# Patient Record
Sex: FEMALE | Race: WHITE | NOT HISPANIC OR LATINO | Employment: OTHER | URBAN - METROPOLITAN AREA
[De-identification: names, ages, dates, MRNs, and addresses within clinical notes are randomized per-mention and may not be internally consistent; named-entity substitution may affect disease eponyms.]

---

## 2018-03-20 ENCOUNTER — TELEPHONE (OUTPATIENT)
Dept: HEMATOLOGY ONCOLOGY | Facility: MEDICAL CENTER | Age: 63
End: 2018-03-20

## 2018-03-20 NOTE — TELEPHONE ENCOUNTER
The patient left a message with Virginia Lang regarding a referral to the lung cancer screening program.  Returned the patient's call and left a message informing her that I will contact Dr. Fitzgerald's office and request another order.  Asked her to please call back so I can verify eligibility and that we received the order.

## 2018-03-22 ENCOUNTER — TELEPHONE (OUTPATIENT)
Dept: HEMATOLOGY ONCOLOGY | Facility: MEDICAL CENTER | Age: 63
End: 2018-03-22

## 2018-03-22 DIAGNOSIS — F17.210 CIGARETTE NICOTINE DEPENDENCE WITHOUT COMPLICATION: Primary | ICD-10-CM

## 2018-03-22 NOTE — TELEPHONE ENCOUNTER
Need additional information. 1st attempt to contact the patient,- left voicemail for patient requesting a return call to verify eligibility for LCSP.   Ref: Jacinda Shoemaker   Dx: Nicotine Dependence

## 2018-03-26 ENCOUNTER — TELEPHONE (OUTPATIENT)
Dept: HEMATOLOGY ONCOLOGY | Facility: MEDICAL CENTER | Age: 63
End: 2018-03-26

## 2018-03-26 NOTE — TELEPHONE ENCOUNTER
Received referral to lung cancer screening program.  Chart review to assess for lung cancer screening program eligibility.   1. Age 55-77 yrs of age? Yes 62 y.o.  2. 30 pack year hx of smoking, or greater? Yes 1/2-1 ppdx 51 yrs= 38.25 pkyr hx  3. Current smoker or if quit, has pt quit within last 15 yrs?Yes  Current- a little  4. Any signs or symptoms of lung cancer? None noted  5. Previous history of lung cancer? None noted  6. Chest CT within past 12 mos.? None noted  Patient does meet eligibility criteria. LCSP scheduling notified to schedule the shared decision making visit.

## 2018-04-05 ENCOUNTER — OFFICE VISIT (OUTPATIENT)
Dept: HEMATOLOGY ONCOLOGY | Facility: MEDICAL CENTER | Age: 63
End: 2018-04-05
Payer: COMMERCIAL

## 2018-04-05 VITALS
HEIGHT: 60 IN | DIASTOLIC BLOOD PRESSURE: 78 MMHG | WEIGHT: 171.41 LBS | HEART RATE: 71 BPM | RESPIRATION RATE: 16 BRPM | TEMPERATURE: 98.1 F | BODY MASS INDEX: 33.65 KG/M2 | OXYGEN SATURATION: 93 % | SYSTOLIC BLOOD PRESSURE: 122 MMHG

## 2018-04-05 DIAGNOSIS — F17.210 CIGARETTE SMOKER: ICD-10-CM

## 2018-04-05 PROCEDURE — G0296 VISIT TO DETERM LDCT ELIG: HCPCS | Performed by: NURSE PRACTITIONER

## 2018-04-05 ASSESSMENT — ENCOUNTER SYMPTOMS
SHORTNESS OF BREATH: 0
WEIGHT LOSS: 0
COUGH: 1
WHEEZING: 1
HEMOPTYSIS: 0
SPUTUM PRODUCTION: 1

## 2018-04-05 ASSESSMENT — PAIN SCALES - GENERAL: PAINLEVEL: NO PAIN

## 2018-04-05 NOTE — PROGRESS NOTES
"Subjective:      Savanna Mccarthy is a 62 y.o. female who presents for Lung Cancer Screening Program Prescreen (Ref: Jacinda Shoemaker   Dx: Nicotine Dependence) for lung cancer screening shared decision making visit.       HPI   Patient seen today for initial lung cancer screening visit. Patient referred by PCP, Jacinda Shoemaker.     The patient meets eligibility criteria including age, smoking history (30+ pack years), if former smoker, quit in the last 15 years, and absence of signs or symptoms of lung cancer.    - Age - 62  - Smoking history - Patient has smoked for 51 years at an average of 1 ppd = 50 pack year smoking history.  - Current smoking status - Current smoker  - No symptoms of lung cancer and no previous history of lung cancer       Allergies   Allergen Reactions   • Ciprofloxacin Vomiting   • Morphine      \"makes her crazy\"         Current Outpatient Prescriptions on File Prior to Visit   Medication Sig Dispense Refill   • calcium carbonate (TUMS) 500 MG CHEW Take 500 mg by mouth every 2 hours.     • oxycodone-acetaminophen (PERCOCET) 5-325 MG TABS Take 1-2 Tabs by mouth every four hours as needed. 20 Each 0   • Oxycodone-Acetaminophen (PERCOCET-10)  MG TABS Take 1-2 Tabs by mouth every four hours as needed.       • methocarbamol (ROBAXIN) 500 MG TABS Take 500 mg by mouth every 8 hours as needed.       • cephALEXin (KEFLEX) 500 MG CAPS Take 500 mg by mouth every 6 hours. For ten days      • Homeopathic Products (ZICAM COLD REMEDY PO) Take  by mouth.       • Ascorbic Acid (VITAMIN C CR) 1000 MG TBCR Take 1,000 mg by mouth.       • varenicline (CHANTIX) 1 MG tablet Take 1 mg by mouth 2 times a day.     • docosahexanoic acid (OMEGA 3 FA) 1000 MG CAPS Take 1,000 mg by mouth 3 times a day, with meals.     • therapeutic multivitamin-minerals (THERAGRAN-M) TABS Take 1 Tab by mouth every day.     • magnesium oxide (MAG-OX) 400 MG TABS Take 400 mg by mouth every day.     • Pyridoxine HCl (VITAMIN " B-6 PO) Take  by mouth. 2mg daily        No current facility-administered medications on file prior to visit.        Review of Systems   Constitutional: Negative for malaise/fatigue and weight loss.   Respiratory: Positive for cough (morning), sputum production (yellow) and wheezing (no inhalers - clears with cough). Negative for hemoptysis and shortness of breath.         Objective:     /78   Pulse 71   Temp 36.7 °C (98.1 °F)   Resp 16   Ht 1.524 m (5')   Wt 77.7 kg (171 lb 6.5 oz)   SpO2 93%   Breastfeeding? No   BMI 33.48 kg/m²        Physical Exam   Constitutional: She is oriented to person, place, and time. She appears well-developed and well-nourished. No distress.   Cardiovascular: Normal rate, regular rhythm and normal heart sounds.  Exam reveals no gallop and no friction rub.    No murmur heard.  Pulmonary/Chest: Effort normal and breath sounds normal. No respiratory distress. She has no wheezes.   Musculoskeletal: Normal range of motion.   Neurological: She is alert and oriented to person, place, and time.   Skin: Skin is warm and dry. She is not diaphoretic.   Vitals reviewed.         Assessment/Plan:     1. Cigarette smoker  CT-LUNG CANCER-SCREENING         We conducted a shared decision-making process using a decision aid. We reviewed benefits and harms of screening, including false positives and potential need for additional diagnostic testing, the possibility of over diagnosis, and total radiation exposure.    We discussed the importance of adhering to annual LDCT screening. We also discussed the impact of comorbities on the patient's the ability or willingness to undergo diagnostic procedure(s) and treatment.    Counseling on the importance of maintaining cigarette smoking abstinence if former smoker; or the importance of smoking cessation if current smoker and, if appropriate, furnishing of information about tobacco cessation interventions. I provided patient with smoking cessation  materials and resources within [facility] and the community. Patient appreciative of the resources.     Based on our discussion, we have decided to begin annual lung cancer screening starting now.

## 2018-04-12 ENCOUNTER — HOSPITAL ENCOUNTER (OUTPATIENT)
Dept: RADIOLOGY | Facility: MEDICAL CENTER | Age: 63
End: 2018-04-12
Attending: NURSE PRACTITIONER
Payer: COMMERCIAL

## 2018-04-12 DIAGNOSIS — F17.210 CIGARETTE SMOKER: ICD-10-CM

## 2018-04-12 PROCEDURE — G0297 LDCT FOR LUNG CA SCREEN: HCPCS

## 2018-04-16 ENCOUNTER — TELEPHONE (OUTPATIENT)
Dept: HEMATOLOGY ONCOLOGY | Facility: MEDICAL CENTER | Age: 63
End: 2018-04-16

## 2018-04-16 NOTE — TELEPHONE ENCOUNTER
Attempted to reach patient with CT results for lung cancer screening. Left message requesting call back to RN at [phone].

## 2018-04-17 ENCOUNTER — TELEPHONE (OUTPATIENT)
Dept: HEMATOLOGY ONCOLOGY | Facility: MEDICAL CENTER | Age: 63
End: 2018-04-17

## 2018-04-17 NOTE — TELEPHONE ENCOUNTER
Second attempt to reach pt with lung cancer screening results.  Left voicemail requesting call back to RN.

## 2018-04-18 ENCOUNTER — TELEPHONE (OUTPATIENT)
Dept: HEMATOLOGY ONCOLOGY | Facility: MEDICAL CENTER | Age: 63
End: 2018-04-18

## 2018-04-18 NOTE — TELEPHONE ENCOUNTER
Third call to pt with results of LDCT exam performed on 4/12/18 .  Notified her that the results showed a 2 mm noncalcified pulmonary nodule identified in the right upper pulmonary lobe that had a benign, or non cancer, appearance. To make sure these nodule(s) are benign, and remain unchanged, we recommend a follow-up low-dose chest CT in 12 months.   Informed of incidental findings of calcific atherosclerosis is noted in the aorta and coronary arteries and mildly enlarged left adrenal gland is unchanged compared to the prior CT.with follow-up back to PCP.  Patient agrees to all recommendations. Referring provider Dr. Jacinda Shoemaker with [facility] faxed results and RN note at [phone] notified.  Health maintenance updated and patient sent lung cancer screening result letter.

## 2018-05-31 ENCOUNTER — HOSPITAL ENCOUNTER (OUTPATIENT)
Dept: HOSPITAL 8 - CFH | Age: 63
Discharge: HOME | End: 2018-05-31
Attending: FAMILY MEDICINE
Payer: COMMERCIAL

## 2018-05-31 DIAGNOSIS — I25.10: ICD-10-CM

## 2018-05-31 DIAGNOSIS — I25.9: Primary | ICD-10-CM

## 2018-05-31 PROCEDURE — 93017 CV STRESS TEST TRACING ONLY: CPT

## 2018-05-31 PROCEDURE — 78452 HT MUSCLE IMAGE SPECT MULT: CPT

## 2018-05-31 PROCEDURE — A9502 TC99M TETROFOSMIN: HCPCS

## 2018-09-04 ENCOUNTER — HOSPITAL ENCOUNTER (OUTPATIENT)
Dept: RADIOLOGY | Facility: MEDICAL CENTER | Age: 63
End: 2018-09-04
Attending: ORTHOPAEDIC SURGERY | Admitting: ORTHOPAEDIC SURGERY
Payer: COMMERCIAL

## 2018-09-04 DIAGNOSIS — Z01.811 PRE-OPERATIVE RESPIRATORY EXAMINATION: ICD-10-CM

## 2018-09-04 DIAGNOSIS — Z01.812 PRE-OPERATIVE LABORATORY EXAMINATION: ICD-10-CM

## 2018-09-04 DIAGNOSIS — Z01.810 PRE-OPERATIVE CARDIOVASCULAR EXAMINATION: ICD-10-CM

## 2018-09-04 LAB
ALBUMIN SERPL BCP-MCNC: 4.1 G/DL (ref 3.2–4.9)
ALBUMIN/GLOB SERPL: 1.7 G/DL
ALP SERPL-CCNC: 73 U/L (ref 30–99)
ALT SERPL-CCNC: 14 U/L (ref 2–50)
ANION GAP SERPL CALC-SCNC: 8 MMOL/L (ref 0–11.9)
AST SERPL-CCNC: 14 U/L (ref 12–45)
BILIRUB SERPL-MCNC: 0.3 MG/DL (ref 0.1–1.5)
BUN SERPL-MCNC: 12 MG/DL (ref 8–22)
CALCIUM SERPL-MCNC: 10 MG/DL (ref 8.5–10.5)
CHLORIDE SERPL-SCNC: 107 MMOL/L (ref 96–112)
CO2 SERPL-SCNC: 25 MMOL/L (ref 20–33)
CREAT SERPL-MCNC: 0.88 MG/DL (ref 0.5–1.4)
EKG IMPRESSION: NORMAL
ERYTHROCYTE [DISTWIDTH] IN BLOOD BY AUTOMATED COUNT: 50.6 FL (ref 35.9–50)
GLOBULIN SER CALC-MCNC: 2.4 G/DL (ref 1.9–3.5)
GLUCOSE SERPL-MCNC: 62 MG/DL (ref 65–99)
HCT VFR BLD AUTO: 46.3 % (ref 37–47)
HGB BLD-MCNC: 14.3 G/DL (ref 12–16)
MCH RBC QN AUTO: 29 PG (ref 27–33)
MCHC RBC AUTO-ENTMCNC: 30.9 G/DL (ref 33.6–35)
MCV RBC AUTO: 93.9 FL (ref 81.4–97.8)
PLATELET # BLD AUTO: 275 K/UL (ref 164–446)
PMV BLD AUTO: 10.4 FL (ref 9–12.9)
POTASSIUM SERPL-SCNC: 4.4 MMOL/L (ref 3.6–5.5)
PROT SERPL-MCNC: 6.5 G/DL (ref 6–8.2)
RBC # BLD AUTO: 4.93 M/UL (ref 4.2–5.4)
SODIUM SERPL-SCNC: 140 MMOL/L (ref 135–145)
WBC # BLD AUTO: 10.1 K/UL (ref 4.8–10.8)

## 2018-09-04 PROCEDURE — 36415 COLL VENOUS BLD VENIPUNCTURE: CPT

## 2018-09-04 PROCEDURE — 93010 ELECTROCARDIOGRAM REPORT: CPT | Performed by: INTERNAL MEDICINE

## 2018-09-04 PROCEDURE — 93005 ELECTROCARDIOGRAM TRACING: CPT

## 2018-09-04 PROCEDURE — 85027 COMPLETE CBC AUTOMATED: CPT

## 2018-09-04 PROCEDURE — 71045 X-RAY EXAM CHEST 1 VIEW: CPT

## 2018-09-04 PROCEDURE — 80053 COMPREHEN METABOLIC PANEL: CPT

## 2018-09-04 RX ORDER — HYDROCODONE BITARTRATE AND ACETAMINOPHEN 5; 325 MG/1; MG/1
1-2 TABLET ORAL EVERY 4 HOURS PRN
COMMUNITY
End: 2018-12-23

## 2018-09-04 RX ORDER — ASPIRIN 325 MG
325-975 TABLET ORAL EVERY 6 HOURS PRN
COMMUNITY

## 2018-09-04 RX ORDER — IBUPROFEN 800 MG/1
800 TABLET ORAL EVERY 8 HOURS PRN
COMMUNITY
End: 2018-12-23

## 2018-09-04 NOTE — OR NURSING
"Preadmit appointment: \" Preparing for your Procedure information\" sheet given to patient with verbal and written instructions. Patient instructed to continue prescribed medications through the day before surgery, instructed to take the following medications the day of surgery per anesthesia protocol: Hydrocodone if needed  "

## 2018-09-11 ENCOUNTER — HOSPITAL ENCOUNTER (OUTPATIENT)
Facility: MEDICAL CENTER | Age: 63
End: 2018-09-11
Attending: ORTHOPAEDIC SURGERY | Admitting: ORTHOPAEDIC SURGERY
Payer: COMMERCIAL

## 2018-09-11 VITALS
SYSTOLIC BLOOD PRESSURE: 106 MMHG | BODY MASS INDEX: 32.17 KG/M2 | TEMPERATURE: 96.8 F | HEART RATE: 69 BPM | HEIGHT: 61 IN | OXYGEN SATURATION: 91 % | WEIGHT: 170.42 LBS | RESPIRATION RATE: 16 BRPM | DIASTOLIC BLOOD PRESSURE: 56 MMHG

## 2018-09-11 PROCEDURE — A9270 NON-COVERED ITEM OR SERVICE: HCPCS

## 2018-09-11 PROCEDURE — 501838 HCHG SUTURE GENERAL: Performed by: ORTHOPAEDIC SURGERY

## 2018-09-11 PROCEDURE — 700102 HCHG RX REV CODE 250 W/ 637 OVERRIDE(OP)

## 2018-09-11 PROCEDURE — C1713 ANCHOR/SCREW BN/BN,TIS/BN: HCPCS | Performed by: ORTHOPAEDIC SURGERY

## 2018-09-11 PROCEDURE — 700111 HCHG RX REV CODE 636 W/ 250 OVERRIDE (IP)

## 2018-09-11 PROCEDURE — A4450 NON-WATERPROOF TAPE: HCPCS | Performed by: ORTHOPAEDIC SURGERY

## 2018-09-11 PROCEDURE — 160035 HCHG PACU - 1ST 60 MINS PHASE I: Performed by: ORTHOPAEDIC SURGERY

## 2018-09-11 PROCEDURE — 502576 HCHG PACK, HAND: Performed by: ORTHOPAEDIC SURGERY

## 2018-09-11 PROCEDURE — 160029 HCHG SURGERY MINUTES - 1ST 30 MINS LEVEL 4: Performed by: ORTHOPAEDIC SURGERY

## 2018-09-11 PROCEDURE — 160025 RECOVERY II MINUTES (STATS): Performed by: ORTHOPAEDIC SURGERY

## 2018-09-11 PROCEDURE — A6222 GAUZE <=16 IN NO W/SAL W/O B: HCPCS | Performed by: ORTHOPAEDIC SURGERY

## 2018-09-11 PROCEDURE — C1889 IMPLANT/INSERT DEVICE, NOC: HCPCS | Performed by: ORTHOPAEDIC SURGERY

## 2018-09-11 PROCEDURE — 160022 HCHG BLOCK: Performed by: ORTHOPAEDIC SURGERY

## 2018-09-11 PROCEDURE — 700105 HCHG RX REV CODE 258: Performed by: ORTHOPAEDIC SURGERY

## 2018-09-11 PROCEDURE — 500423 HCHG DRESSING, ABD COMBINE: Performed by: ORTHOPAEDIC SURGERY

## 2018-09-11 PROCEDURE — 502240 HCHG MISC OR SUPPLY RC 0272: Performed by: ORTHOPAEDIC SURGERY

## 2018-09-11 PROCEDURE — 502000 HCHG MISC OR IMPLANTS RC 0278: Performed by: ORTHOPAEDIC SURGERY

## 2018-09-11 PROCEDURE — 160048 HCHG OR STATISTICAL LEVEL 1-5: Performed by: ORTHOPAEDIC SURGERY

## 2018-09-11 PROCEDURE — 160002 HCHG RECOVERY MINUTES (STAT): Performed by: ORTHOPAEDIC SURGERY

## 2018-09-11 PROCEDURE — 160036 HCHG PACU - EA ADDL 30 MINS PHASE I: Performed by: ORTHOPAEDIC SURGERY

## 2018-09-11 PROCEDURE — 700101 HCHG RX REV CODE 250

## 2018-09-11 PROCEDURE — 502581 HCHG PACK, SHOULDER ARTHROSCOPY: Performed by: ORTHOPAEDIC SURGERY

## 2018-09-11 PROCEDURE — 160009 HCHG ANES TIME/MIN: Performed by: ORTHOPAEDIC SURGERY

## 2018-09-11 PROCEDURE — 500562 HCHG FIBERWIRE: Performed by: ORTHOPAEDIC SURGERY

## 2018-09-11 PROCEDURE — 160041 HCHG SURGERY MINUTES - EA ADDL 1 MIN LEVEL 4: Performed by: ORTHOPAEDIC SURGERY

## 2018-09-11 PROCEDURE — 160046 HCHG PACU - 1ST 60 MINS PHASE II: Performed by: ORTHOPAEDIC SURGERY

## 2018-09-11 DEVICE — GRAFT SOFT TISSUE DERMAL MATRIX  ALLOMEND 4X8CM: Type: IMPLANTABLE DEVICE | Site: SHOULDER | Status: FUNCTIONAL

## 2018-09-11 DEVICE — ANCHOR KNOTLESS REELX STT 4.5MM (5EA/BX): Type: IMPLANTABLE DEVICE | Site: SHOULDER | Status: FUNCTIONAL

## 2018-09-11 DEVICE — ANCHOR SUTURE ICONIX 3 WITH 3 STRANDS #2 FORCE FIBER 2.3MM (5EA/BX): Type: IMPLANTABLE DEVICE | Site: SHOULDER | Status: FUNCTIONAL

## 2018-09-11 RX ORDER — ONDANSETRON 2 MG/ML
INJECTION INTRAMUSCULAR; INTRAVENOUS
Status: COMPLETED
Start: 2018-09-11 | End: 2018-09-11

## 2018-09-11 RX ORDER — BACITRACIN 50000 [IU]/1
INJECTION, POWDER, FOR SOLUTION INTRAMUSCULAR
Status: DISCONTINUED | OUTPATIENT
Start: 2018-09-11 | End: 2018-09-11 | Stop reason: HOSPADM

## 2018-09-11 RX ORDER — OXYCODONE HCL 5 MG/5 ML
SOLUTION, ORAL ORAL
Status: COMPLETED
Start: 2018-09-11 | End: 2018-09-11

## 2018-09-11 RX ORDER — SODIUM CHLORIDE, SODIUM LACTATE, POTASSIUM CHLORIDE, CALCIUM CHLORIDE 600; 310; 30; 20 MG/100ML; MG/100ML; MG/100ML; MG/100ML
INJECTION, SOLUTION INTRAVENOUS CONTINUOUS
Status: DISCONTINUED | OUTPATIENT
Start: 2018-09-11 | End: 2018-09-11 | Stop reason: HOSPADM

## 2018-09-11 RX ORDER — LIDOCAINE HYDROCHLORIDE 10 MG/ML
INJECTION, SOLUTION EPIDURAL; INFILTRATION; INTRACAUDAL; PERINEURAL
Status: COMPLETED
Start: 2018-09-11 | End: 2018-09-11

## 2018-09-11 RX ORDER — EPINEPHRINE 1 MG/ML(1)
AMPUL (ML) INJECTION
Status: DISCONTINUED | OUTPATIENT
Start: 2018-09-11 | End: 2018-09-11 | Stop reason: HOSPADM

## 2018-09-11 RX ORDER — ACETAMINOPHEN 500 MG
TABLET ORAL
Status: COMPLETED
Start: 2018-09-11 | End: 2018-09-11

## 2018-09-11 RX ORDER — CEFAZOLIN SODIUM 1 G/3ML
INJECTION, POWDER, FOR SOLUTION INTRAMUSCULAR; INTRAVENOUS
Status: DISCONTINUED | OUTPATIENT
Start: 2018-09-11 | End: 2018-09-11 | Stop reason: HOSPADM

## 2018-09-11 RX ORDER — CELECOXIB 200 MG/1
CAPSULE ORAL
Status: COMPLETED
Start: 2018-09-11 | End: 2018-09-11

## 2018-09-11 RX ADMIN — OXYCODONE HYDROCHLORIDE 10 MG: 5 SOLUTION ORAL at 10:59

## 2018-09-11 RX ADMIN — ACETAMINOPHEN 1000 MG: 500 TABLET, COATED ORAL at 06:30

## 2018-09-11 RX ADMIN — ONDANSETRON 4 MG: 2 INJECTION INTRAMUSCULAR; INTRAVENOUS at 10:19

## 2018-09-11 RX ADMIN — LIDOCAINE HYDROCHLORIDE 0.2 ML: 10 INJECTION, SOLUTION EPIDURAL; INFILTRATION; INTRACAUDAL; PERINEURAL at 06:30

## 2018-09-11 RX ADMIN — SODIUM CHLORIDE, POTASSIUM CHLORIDE, SODIUM LACTATE AND CALCIUM CHLORIDE 1000 ML: 600; 310; 30; 20 INJECTION, SOLUTION INTRAVENOUS at 06:30

## 2018-09-11 RX ADMIN — CELECOXIB 400 MG: 200 CAPSULE ORAL at 06:30

## 2018-09-11 ASSESSMENT — PAIN SCALES - GENERAL
PAINLEVEL_OUTOF10: 0
PAINLEVEL_OUTOF10: 2
PAINLEVEL_OUTOF10: 6
PAINLEVEL_OUTOF10: 5
PAINLEVEL_OUTOF10: 6
PAINLEVEL_OUTOF10: 2
PAINLEVEL_OUTOF10: 0

## 2018-09-11 NOTE — OR NURSING
0944- Patient arrived from OR. Respirations even and unlabored. Patient sedated with oral airway in place. Surgical dressings in place to L shoulder and L wrist. C/D/I. Immobilizer in place. Pulses 2+ throughout. Brisk cap refill present. VSS, see flowsheets.   1000- Patient waking up. Declines nausea and pain. Reports pressure to L wrist. Declines need for medication.   1015- Patient reporting onset of nausea. Medication administered, see MAR.   1030- Patient in and out of sleep. Waking up irritable and uncooperative. Frequently pulling off oxygen, pulling at medical cords and IV. Redirection, reassurance, and repositioning provided.   1050- Patient reports decrease in nausea. Reporting 6/10 pressure in L wrist, requesting pain medication. Administered, see anesthesia record and MAR.   1105- Patient resting with eyes closed.   1115- Patient awake reports decrease in nausea and 2/10 pressure in L wrist. Requesting to go home. Breathing exercises completed-- cough deep breathing. O2 turned off.   1135- VSS, see flowsheets. Report to NICOLASA Richards.

## 2018-09-11 NOTE — OR SURGEON
Immediate Post OP Note    PreOp Diagnosis: LEFT carpal tunnel syndrome and large retracted rotator cuff tear with impingement and chronic LHB tendon tear    PostOp Diagnosis: SAME     Procedure(s): LEFT   CARPAL TUNNEL RELEASE - Wound Class: Clean  SHOULDER DECOMPRESSION ARTHROSCOPIC- SUBACROMIAL, LABRAL DEBRIDEMENT - Wound Class: Clean  SHOULDER ARTHROSCOPY W/ ROTATOR CUFF REPAIR-SUPERIOR CAPSULAR RECONSTRUCTION - Wound Class: Clean    Surgeon(s):  Margaret Olson M.D.    Anesthesiologist/Type of Anesthesia:  Anesthesiologist: Damon Sousa M.D.  Anesthesia Technician: Lenin Moreno/General    Surgical Staff:  Circulator: Lilly Jacob R.N.  Relief Circulator: Marie Kilgore R.N.  Scrub Person: Rufina Corbin  Private Scrub: MEGGAN [email or web address]    Specimens removed if any:  * No specimens in log *    Estimated Blood Loss: min    Findings: as above    Complications: none    [place] and [place] graft        9/11/2018 9:21 AM Margaret Olson M.D.

## 2018-09-11 NOTE — DISCHARGE INSTRUCTIONS
ACTIVITY: Rest and take it easy for the first 24 hours.  A responsible adult is recommended to remain with you during that time.  It is normal to feel sleepy.  We encourage you to not do anything that requires balance, judgment or coordination.    MILD FLU-LIKE SYMPTOMS ARE NORMAL. YOU MAY EXPERIENCE GENERALIZED MUSCLE ACHES, THROAT IRRITATION, HEADACHE AND/OR SOME NAUSEA.    FOR 24 HOURS DO NOT:  Drive, operate machinery or run household appliances.  Drink beer or alcoholic beverages.   Make important decisions or sign legal documents.    SPECIAL INSTRUCTIONS: Post-Operative Shoulder Instructions       Dressing and wound care: Keep your shoulder dressing clean and dry after surgery.  Be aware that some leaking of blood or fluid from your dressing can occur and is normal. You may remove your dressing 3 days after the operation.  Notice that you have a single incision and/or several small incisions that have been closed with stitches.  Cover each of these incisions with a light dressing or band-aids.  This keeps the surgical incisions clean, as well as preventing your clothes from spotting with blood or fluid.  Change band-aids or light dressing daily.     Shower / bathing: Keep the shoulder dry for 3 days after your surgery.  Then, you may shower. You may let soap and water run over skin incisions, but do not immerse your shoulder in water.  No swimming pools, hot tubs, or baths are recommended until at least 3 weeks after surgery.     Ice: Apply an ice pack to your shoulder (15 minutes on the shoulder, 15 minutes off the shoulder), as you feel necessary to help with the pain and swelling.         Sling / Shoulder Immobilizer: The sling should be on at all times, except when bathing and doing your demonstrated exercises.     Activity: It is important to move your shoulder, as well as your elbow, wrist, and hand several times daily, starting the day after surgery.  You may do pendulum exercises with your operative  arm starting the day after surgery.  Pendulum (dangling [place]) exercises are encouraged 2-3 times daily.  The sling will need to be removed for pendulum exercises.     Pain medication: Take your pain medicine, as needed and prescribed.  Do not drive or operate machinery while taking narcotic pain medication.   You may start or resume anti-inflammatory medication (i.e. ibuprofen, naproxen) anytime after surgery, which should be taken with food to avoid stomach irritation.     Problems:     If you are having problems with your shoulder (unexpected pain, excessive bleeding or discharge from your incisions, fevers/chills) do not hesitate to call the office or visit the nearest emergency room for evaluation.     Follow-up:     Make sure that you have an appointment 7-14 days following surgery.  Your stitches will be removed, and your procedure/rehabilitation will be discussed at that time.   Physical therapy may be prescribed at that time.      ***Please keep wrist splint on and dry until your first postop appointment.     Margaret Olson MD   [facility]   [phone]    DIET: To avoid nausea, slowly advance diet as tolerated, avoiding spicy or greasy foods for the first day.  Add more substantial food to your diet according to your physician's instructions.  Babies can be fed formula or breast milk as soon as they are hungry.  INCREASE FLUIDS AND FIBER TO AVOID CONSTIPATION.      You should CALL YOUR PHYSICIAN if you develop:  Fever greater than 101 degrees F.  Pain not relieved by medication, or persistent nausea or vomiting.  Excessive bleeding (blood soaking through dressing) or unexpected drainage from the wound.  Extreme redness or swelling around the incision site, drainage of pus or foul smelling drainage.  Inability to urinate or empty your bladder within 8 hours.  Problems with breathing or chest pain.    You should call 911 if you develop problems with breathing or chest pain.  If you are unable  to contact your doctor or surgical center, you should go to the nearest emergency room or urgent care center.  Physician's telephone #: [phone]    If any questions arise, call your doctor.  If your doctor is not available, please feel free to call the Surgical Center at [phone].  The Center is open Monday through Friday from 7AM to 7PM.  You can also call the HEALTH HOTLINE open 24 hours/day, 7 days/week and speak to a nurse at [phone], or toll free at [phone].    A registered nurse may call you a few days after your surgery to see how you are doing after your procedure.    MEDICATIONS: Resume taking daily medication.  Take prescribed pain medication with food.  If no medication is prescribed, you may take non-aspirin pain medication if needed.  PAIN MEDICATION CAN BE VERY CONSTIPATING.  Take a stool softener or laxative such as senokot, pericolace, or milk of magnesia if needed.    Prescription given for (previously filled).  Last pain medication given at 10:59 AM.    If your physician has prescribed pain medication that includes Acetaminophen (Tylenol), do not take additional Acetaminophen (Tylenol) while taking the prescribed medication.    Depression / Suicide Risk    As you are discharged from this [facility] facility, it is important to learn how to keep safe from harming yourself.    Recognize the warning signs:  · Abrupt changes in personality, positive or negative- including increase in energy   · Giving away possessions  · Change in eating patterns- significant weight changes-  positive or negative  · Change in sleeping patterns- unable to sleep or sleeping all the time   · Unwillingness or inability to communicate  · Depression  · Unusual sadness, discouragement and loneliness  · Talk of wanting to die  · Neglect of personal appearance   · Rebelliousness- reckless behavior  · Withdrawal from people/activities they love  · Confusion- inability to concentrate     If you or a  loved one observes any of these behaviors or has concerns about self-harm, here's what you can do:  · Talk about it- your feelings and reasons for harming yourself  · Remove any means that you might use to hurt yourself (examples: pills, rope, extension cords, firearm)  · Get professional help from the community (Mental Health, Substance Abuse, psychological counseling)  · Do not be alone:Call your Safe Contact- someone whom you trust who will be there for you.  · Call your local CRISIS HOTLINE 317-3706 or 214-441-4206  · Call your local Children's Mobile Crisis Response Team [place] (429) 565-5113 or www.[employer]  · Call the toll free National Suicide Prevention Hotlines   · National Suicide Prevention Lifeline 201-058-CFZG (3083)  · [place] Line Network 800-SUICIDE (617-9711)        Peripheral Nerve Block Discharge Instructions from Same Day Surgery and Inpatient :    What to Expect - Upper Extremity  · You may experience numbness and weakness in shoulder  on the same side as your surgery  · This is normal. For some people, this may be an unpleasant sensation. Be very careful with your numb limb  · Ask for help when you need it  Shoulder Surgery Side Effects  · In addition to numbness and weakness you may experience other symptoms  · Other nerves that are close to those nerves injected can also be affected by local anesthesia  · You may experience a hoarseness in your voice  · Your breathing may feel different  · You may also notice drooping of your eyelid, pupil constriction, and decreased sweating, on the side of your surgery  · All of these side effects are normal and will resolve when the local anesthetic wears off   Prevent Injury  · Protect the limb like a baby  · Beware of exposing your limb to extreme heat or cold or trauma  · The limb may be injured without you noticing because it is numb  · Keep the limb elevated whenever possible  · Do not sleep on the limb  · Change the position of  "the limb regularly  · Avoid putting pressure on your surgical limb  Pain Control  · The initial block on the day of surgery will make your extremity feel \"numb\"  · Any consecutive injection including prior to discharge from the hospital will make your extremity feel \"numb\"  · You may feel an aching or burning when the local anesthesia starts to wear off  · Take pain pills as prescribed by your surgeon  · Call your surgeon or anesthesiologist if you do not have adequate pain control    "

## 2018-09-11 NOTE — OR NURSING
1142 Arrived from pacu via gurney, changed and into recliner immediately requested sprite, gave to pt.   1150 left shoulder immobilizer on and ice to shoulder and left wrist, dressings clean dry intact, cap refill left fingers q 3 sec and pulses equal bilaterally both hands , pt c/o nausea, burping which she states helps, states pain controlled and her nausea is just what happens after anesthesia, refused any further medication  1200 discharge instructions given to pt and her friend, pt continues with sl nausea, tolerating sips water and continues to burp, wants to go home, noted sats low 90's encouraged deep breathing and slow position changes for safety, noted vitals stable,   1215 continues with slight nausea but again states wants to just go home, continues to use the sensi and states helping, denies pain,   1225 to w/c tolerated well, had episode of emesis just before getting into the w/c and stated that helped her nausea and feels better, reinf to pt that if nausea continues to the point of inability to drink or hold down fluids to call md and she and friend agreed. Se'linda.

## 2018-09-11 NOTE — OP REPORT
DATE OF SERVICE:  09/11/2018    PREOPERATIVE DIAGNOSES:  1.  Left carpal tunnel syndrome.  2.  Left shoulder large retracted rotator cuff tear with impingement, chronic   long head of biceps tendon tear.    POSTOPERATIVE DIAGNOSES:  1.  Left carpal tunnel syndrome.  2.  Left shoulder impingement syndrome with massive retracted rotator cuff   tear, mild glenohumeral joint osteoarthritis, global labral fraying, chronic   long head of biceps tendon tear.    PROCEDURES PERFORMED:  1.  Left carpal tunnel release.  2.  Left shoulder arthroscopy, subacromial decompression, labral debridement,   rotator cuff repair, arthroscopic superior capsular reconstruction.    SURGEON:  Margaret Olson MD    ASSISTANT:  Parminder Morris CFA    ANESTHESIOLOGIST:  Damon Sousa MD    TYPE OF ANESTHESIA:  General, with preoperative interscalene nerve block.    IV FLUID:  1 liter crystalloid.    ESTIMATED BLOOD LOSS:  Minimal.    DRAINS:  None.    SPECIMENS:  None.    COMPLICATIONS:  None.    IMPLANTS:  Elba Iconix anchor x2, ReelX anchor x2, Valeris Apollo anchors   x2.    REASON FOR PROCEDURE:  The patient is a 63-year-old female with left shoulder   pain and weakness, as well as left hand numbness and tingling, with an MRI   demonstrating a large retracted rotator cuff tear and an EMG demonstrating   carpal tunnel syndrome.  We decided to proceed with surgery.    DESCRIPTION OF OPERATION:  The patient was given a left interscalene nerve   block by the anesthesiologist before surgery.  Once back in the operating   room, a breathing tube was placed.  She was given 2 g of IV Ancef.  Initially,   the left carpal tunnel procedure was performed.  She was placed supine on the   operating room table.  A nonsterile tourniquet was then placed around her   left upper arm.  The left upper extremity was prepped with ChloraPrep and   draped in standard sterile fashion.  An Esmarch was used to exsanguinate the   limb and the tourniquet was  inflated to 225 mmHg.  A small vertical incision   was made in the palm.  Care was taken not to cross the wrist crease.  Heiss   retractor was placed.  An inside knife was then used to incise the transverse   fibers of the transverse carpal ligament revealing the underlying median   nerve.  A Trade elevator was placed to protect the nerve and dissection took   place with a pair of blunt tipped scissors, releasing the transverse carpal   ligament proximal to the wrist crease.  A tight carpal canal was noted, with   no other abnormalities.  The wound was irrigated.  A 3-0 nylon suture was used   to close the skin.  Sterile dressing was applied.  The patient was then   placed in the lateral decubitus position.  Care was taken to pad her axilla as   well as all bony prominences.  The left upper extremity was then prepped with   ChloraPrep and draped in standard sterile fashion.  It was then placed in the   Arthrex traction device.  Bony landmarks were drawn and a standard posterior   portal was established.  The arthroscope was then inserted into the   glenohumeral joint.  An anterosuperior cannula was placed in the rotator   interval, just beneath the biceps tendon region, although the biceps tendon   was torn and retracted out of the joint.  A probe was inserted.  There was   global labral fraying.  There was mild softening and fibrillation to the   humeral head and glenoid, with no significant arthritis or exposed bone.  A   smoothing chondroplasty was performed as well as a global labral debridement.    The subscapularis tendon was intact.  There was a large superior rotator cuff   tear.  The arthroscope was then inserted into the subacromial space.  A   lateral portal was established.  The 4-0 aggressive shaver was used to remove   the thickened inflamed bursal tissue.  The borders of the acromion were   defined, taking care to maintain the integrity of the coracoacromial ligament.    The 5.5 mm resector was used  to remove the anterior curve as well as lateral   edge.  Portals were switched.  Using a standard cutting block technique from   posterior to anterior, a subacromial decompression was carried out.  This   created a nice smooth surface to the undersurface of the acromion.  Attention   was then turned to the rotator cuff below.  There was a thin infraspinatus   with tearing of the anterior portion.  There was a large retracted   supraspinatus tear.  The cuff grasper was used and there was essentially   little to no good remaining tissue left.  Thus, the decision was made to   proceed with the superior capsule reconstruction as had been planned for   potentially preoperatively.  The superior rim of the glenoid was then   roughened creating a healing response.  The greater tuberosity was then   roughened as well, debriding off the remaining soft tissue, again creating a   healing response for the graft.  The arthroscope was placed laterally.  An   anterosuperior portal was made percutaneously and the first anchor was placed.    This was a triple loaded Iconix sector, 2.3 mm.  A second 2.3 mm anchor was   placed through the Neviaser portal.  This was placed just posterior to the AC   joint.  Excellent purchase into the bone was noted.  The arthroscope was   placed back posteriorly.  A percutaneous portal was established.  An   anterolateral Valeris 5.5 mm Apollo anchor was placed that had been loaded   with 2 mm tape.  A second anchor was placed posteriorly.  This allowed for a   4-anchor construct, and measurements were taken then between the anchors, and   written on a white board at the side of the room.  This allowed for the   quadrilateral graft to then be cut and measured appropriately.  The 4 sites   were then measured, using a suture pull through technique and then the   measuring device between the 2 lateral anchors.  The arthroscope pulp was left   on low flow and attention was turned to the graft on the back  table.  It was   washed with bacitracin.  This was an element graft, from [employer].  It was   cut down appropriately, leaving a 5 mm border anteriorly, posteriorly, as well   as medially and a 10 mm lateral border.  The graft was cut.  Small holes were   punched, and then the graft was left on the back table and attention was   turned back up to the shoulder.  A passport cannula was placed laterally.    Sutures were then pulled out, appropriately from the cannula.  The graft was   then placed through the cannula and slid down, using a double pulley technique   to slide down the medial aspect of the graft onto the superior rim of the   glenoid at the prepared edge.  A non-sliding knot was then tied, nicely   securing the medial aspect onto the prepared glenoid edge.  In a SpeedBridge   construct, an anterolateral, followed by a posterolateral anchor was placed.    This was a ReelX anchor placing the tapes from the medial row Apollo anchors   into it.  Excellent tension was noted on the graft.  The infraspinatus was   then repaired to the posterior aspect of the graft.  This allowed for the   rotator cuff to be repaired, to the back of the graft, to anchor the   infraspinatus and optimize an enhanced proliferation on to the graft.  This   was done using the Kober device, taking side-to-side sutures with a 1.2 mm   tape.  All sutures were passed and then tied down, this allowed for the   infraspinatus to be well repaired to the posterior aspect of the graft.  A   total of 1 liter of bacitracin saline fluid was flushed through the   subacromial space as well as the glenohumeral joint.  All instruments were   then removed.  Portals were closed with 3-0 nylon suture.  A sterile dressing   was applied.  All drapes removed.  The arm was carefully taken out of traction   and placed into a shoulder abduction sling.  The patient was placed supine on   a stretcher and taken to recovery room, in stable condition.        ____________________________________     MD JYOTI FONSECA    DD:  09/11/2018 09:54:13  DT:  09/11/2018 10:16:39    D#:  [id number]  Job#:  [id number]

## 2018-12-03 DIAGNOSIS — Z01.812 PRE-OPERATIVE LABORATORY EXAMINATION: ICD-10-CM

## 2018-12-03 LAB
ERYTHROCYTE [DISTWIDTH] IN BLOOD BY AUTOMATED COUNT: 49.1 FL (ref 35.9–50)
HCT VFR BLD AUTO: 46.2 % (ref 37–47)
HGB BLD-MCNC: 14.6 G/DL (ref 12–16)
MCH RBC QN AUTO: 29.6 PG (ref 27–33)
MCHC RBC AUTO-ENTMCNC: 31.6 G/DL (ref 33.6–35)
MCV RBC AUTO: 93.7 FL (ref 81.4–97.8)
PLATELET # BLD AUTO: 275 K/UL (ref 164–446)
PMV BLD AUTO: 10.8 FL (ref 9–12.9)
RBC # BLD AUTO: 4.93 M/UL (ref 4.2–5.4)
WBC # BLD AUTO: 9.7 K/UL (ref 4.8–10.8)

## 2018-12-03 PROCEDURE — 36415 COLL VENOUS BLD VENIPUNCTURE: CPT

## 2018-12-03 PROCEDURE — 85027 COMPLETE CBC AUTOMATED: CPT

## 2018-12-03 PROCEDURE — 80053 COMPREHEN METABOLIC PANEL: CPT

## 2018-12-04 LAB
ALBUMIN SERPL BCP-MCNC: 4 G/DL (ref 3.2–4.9)
ALBUMIN/GLOB SERPL: 1.6 G/DL
ALP SERPL-CCNC: 81 U/L (ref 30–99)
ALT SERPL-CCNC: 14 U/L (ref 2–50)
ANION GAP SERPL CALC-SCNC: 9 MMOL/L (ref 0–11.9)
AST SERPL-CCNC: 15 U/L (ref 12–45)
BILIRUB SERPL-MCNC: 0.3 MG/DL (ref 0.1–1.5)
BUN SERPL-MCNC: 17 MG/DL (ref 8–22)
CALCIUM SERPL-MCNC: 9.6 MG/DL (ref 8.5–10.5)
CHLORIDE SERPL-SCNC: 103 MMOL/L (ref 96–112)
CO2 SERPL-SCNC: 25 MMOL/L (ref 20–33)
CREAT SERPL-MCNC: 0.89 MG/DL (ref 0.5–1.4)
GLOBULIN SER CALC-MCNC: 2.5 G/DL (ref 1.9–3.5)
GLUCOSE SERPL-MCNC: 40 MG/DL (ref 65–99)
POTASSIUM SERPL-SCNC: 4.3 MMOL/L (ref 3.6–5.5)
PROT SERPL-MCNC: 6.5 G/DL (ref 6–8.2)
SODIUM SERPL-SCNC: 137 MMOL/L (ref 135–145)

## 2018-12-11 ENCOUNTER — HOSPITAL ENCOUNTER (OUTPATIENT)
Facility: MEDICAL CENTER | Age: 63
End: 2018-12-11
Attending: ORTHOPAEDIC SURGERY | Admitting: ORTHOPAEDIC SURGERY
Payer: COMMERCIAL

## 2018-12-11 VITALS
SYSTOLIC BLOOD PRESSURE: 129 MMHG | HEIGHT: 61 IN | DIASTOLIC BLOOD PRESSURE: 86 MMHG | TEMPERATURE: 98.2 F | OXYGEN SATURATION: 91 % | BODY MASS INDEX: 31.88 KG/M2 | RESPIRATION RATE: 20 BRPM | WEIGHT: 168.87 LBS

## 2018-12-11 PROCEDURE — C1889 IMPLANT/INSERT DEVICE, NOC: HCPCS | Performed by: ORTHOPAEDIC SURGERY

## 2018-12-11 PROCEDURE — A4450 NON-WATERPROOF TAPE: HCPCS | Performed by: ORTHOPAEDIC SURGERY

## 2018-12-11 PROCEDURE — A9270 NON-COVERED ITEM OR SERVICE: HCPCS | Performed by: ANESTHESIOLOGY

## 2018-12-11 PROCEDURE — 160036 HCHG PACU - EA ADDL 30 MINS PHASE I: Performed by: ORTHOPAEDIC SURGERY

## 2018-12-11 PROCEDURE — A6222 GAUZE <=16 IN NO W/SAL W/O B: HCPCS | Performed by: ORTHOPAEDIC SURGERY

## 2018-12-11 PROCEDURE — 700101 HCHG RX REV CODE 250

## 2018-12-11 PROCEDURE — 160041 HCHG SURGERY MINUTES - EA ADDL 1 MIN LEVEL 4: Performed by: ORTHOPAEDIC SURGERY

## 2018-12-11 PROCEDURE — 160046 HCHG PACU - 1ST 60 MINS PHASE II: Performed by: ORTHOPAEDIC SURGERY

## 2018-12-11 PROCEDURE — 700111 HCHG RX REV CODE 636 W/ 250 OVERRIDE (IP)

## 2018-12-11 PROCEDURE — 160029 HCHG SURGERY MINUTES - 1ST 30 MINS LEVEL 4: Performed by: ORTHOPAEDIC SURGERY

## 2018-12-11 PROCEDURE — 502000 HCHG MISC OR IMPLANTS RC 0278: Performed by: ORTHOPAEDIC SURGERY

## 2018-12-11 PROCEDURE — 160048 HCHG OR STATISTICAL LEVEL 1-5: Performed by: ORTHOPAEDIC SURGERY

## 2018-12-11 PROCEDURE — 160047 HCHG PACU  - EA ADDL 30 MINS PHASE II: Performed by: ORTHOPAEDIC SURGERY

## 2018-12-11 PROCEDURE — 160025 RECOVERY II MINUTES (STATS): Performed by: ORTHOPAEDIC SURGERY

## 2018-12-11 PROCEDURE — 500423 HCHG DRESSING, ABD COMBINE: Performed by: ORTHOPAEDIC SURGERY

## 2018-12-11 PROCEDURE — 160022 HCHG BLOCK: Performed by: ORTHOPAEDIC SURGERY

## 2018-12-11 PROCEDURE — 501838 HCHG SUTURE GENERAL: Performed by: ORTHOPAEDIC SURGERY

## 2018-12-11 PROCEDURE — 500562 HCHG FIBERWIRE: Performed by: ORTHOPAEDIC SURGERY

## 2018-12-11 PROCEDURE — 700111 HCHG RX REV CODE 636 W/ 250 OVERRIDE (IP): Performed by: ANESTHESIOLOGY

## 2018-12-11 PROCEDURE — 700102 HCHG RX REV CODE 250 W/ 637 OVERRIDE(OP): Performed by: ANESTHESIOLOGY

## 2018-12-11 PROCEDURE — 160035 HCHG PACU - 1ST 60 MINS PHASE I: Performed by: ORTHOPAEDIC SURGERY

## 2018-12-11 PROCEDURE — 160002 HCHG RECOVERY MINUTES (STAT): Performed by: ORTHOPAEDIC SURGERY

## 2018-12-11 PROCEDURE — 160009 HCHG ANES TIME/MIN: Performed by: ORTHOPAEDIC SURGERY

## 2018-12-11 PROCEDURE — 502581 HCHG PACK, SHOULDER ARTHROSCOPY: Performed by: ORTHOPAEDIC SURGERY

## 2018-12-11 DEVICE — GRAFT SOFT TISSUE DERMAL MATRIX  ALLOMEND 4X8CM: Type: IMPLANTABLE DEVICE | Status: FUNCTIONAL

## 2018-12-11 RX ORDER — DIPHENHYDRAMINE HYDROCHLORIDE 50 MG/ML
12.5 INJECTION INTRAMUSCULAR; INTRAVENOUS
Status: DISCONTINUED | OUTPATIENT
Start: 2018-12-11 | End: 2018-12-11 | Stop reason: HOSPADM

## 2018-12-11 RX ORDER — HYDROMORPHONE HYDROCHLORIDE 1 MG/ML
0.2 INJECTION, SOLUTION INTRAMUSCULAR; INTRAVENOUS; SUBCUTANEOUS
Status: DISCONTINUED | OUTPATIENT
Start: 2018-12-11 | End: 2018-12-11 | Stop reason: HOSPADM

## 2018-12-11 RX ORDER — MEPERIDINE HYDROCHLORIDE 25 MG/ML
6.25 INJECTION INTRAMUSCULAR; INTRAVENOUS; SUBCUTANEOUS
Status: DISCONTINUED | OUTPATIENT
Start: 2018-12-11 | End: 2018-12-11 | Stop reason: HOSPADM

## 2018-12-11 RX ORDER — CELECOXIB 200 MG/1
200 CAPSULE ORAL ONCE
Status: COMPLETED | OUTPATIENT
Start: 2018-12-11 | End: 2018-12-11

## 2018-12-11 RX ORDER — HYDROMORPHONE HYDROCHLORIDE 1 MG/ML
0.1 INJECTION, SOLUTION INTRAMUSCULAR; INTRAVENOUS; SUBCUTANEOUS
Status: DISCONTINUED | OUTPATIENT
Start: 2018-12-11 | End: 2018-12-11 | Stop reason: HOSPADM

## 2018-12-11 RX ORDER — OXYCODONE HCL 5 MG/5 ML
10 SOLUTION, ORAL ORAL
Status: DISCONTINUED | OUTPATIENT
Start: 2018-12-11 | End: 2018-12-11 | Stop reason: HOSPADM

## 2018-12-11 RX ORDER — OXYCODONE HYDROCHLORIDE 10 MG/1
10 TABLET ORAL
Status: DISCONTINUED | OUTPATIENT
Start: 2018-12-11 | End: 2018-12-11 | Stop reason: HOSPADM

## 2018-12-11 RX ORDER — GABAPENTIN 300 MG/1
300 CAPSULE ORAL ONCE
Status: COMPLETED | OUTPATIENT
Start: 2018-12-11 | End: 2018-12-11

## 2018-12-11 RX ORDER — ACETAMINOPHEN 500 MG
1000 TABLET ORAL ONCE
Status: COMPLETED | OUTPATIENT
Start: 2018-12-11 | End: 2018-12-11

## 2018-12-11 RX ORDER — EPINEPHRINE 1 MG/ML(1)
AMPUL (ML) INJECTION
Status: DISCONTINUED | OUTPATIENT
Start: 2018-12-11 | End: 2018-12-11 | Stop reason: HOSPADM

## 2018-12-11 RX ORDER — ONDANSETRON 2 MG/ML
4 INJECTION INTRAMUSCULAR; INTRAVENOUS
Status: COMPLETED | OUTPATIENT
Start: 2018-12-11 | End: 2018-12-11

## 2018-12-11 RX ORDER — OXYCODONE HYDROCHLORIDE 5 MG/1
5 TABLET ORAL
Status: DISCONTINUED | OUTPATIENT
Start: 2018-12-11 | End: 2018-12-11 | Stop reason: HOSPADM

## 2018-12-11 RX ORDER — SODIUM CHLORIDE, SODIUM LACTATE, POTASSIUM CHLORIDE, CALCIUM CHLORIDE 600; 310; 30; 20 MG/100ML; MG/100ML; MG/100ML; MG/100ML
INJECTION, SOLUTION INTRAVENOUS CONTINUOUS
Status: DISCONTINUED | OUTPATIENT
Start: 2018-12-11 | End: 2018-12-11 | Stop reason: HOSPADM

## 2018-12-11 RX ORDER — HYDROMORPHONE HYDROCHLORIDE 1 MG/ML
0.4 INJECTION, SOLUTION INTRAMUSCULAR; INTRAVENOUS; SUBCUTANEOUS
Status: DISCONTINUED | OUTPATIENT
Start: 2018-12-11 | End: 2018-12-11 | Stop reason: HOSPADM

## 2018-12-11 RX ORDER — HALOPERIDOL 5 MG/ML
1 INJECTION INTRAMUSCULAR
Status: DISCONTINUED | OUTPATIENT
Start: 2018-12-11 | End: 2018-12-11 | Stop reason: HOSPADM

## 2018-12-11 RX ORDER — SODIUM CHLORIDE, SODIUM LACTATE, POTASSIUM CHLORIDE, CALCIUM CHLORIDE 600; 310; 30; 20 MG/100ML; MG/100ML; MG/100ML; MG/100ML
INJECTION, SOLUTION INTRAVENOUS ONCE
Status: COMPLETED | OUTPATIENT
Start: 2018-12-11 | End: 2018-12-11

## 2018-12-11 RX ORDER — OXYCODONE HCL 5 MG/5 ML
5 SOLUTION, ORAL ORAL
Status: DISCONTINUED | OUTPATIENT
Start: 2018-12-11 | End: 2018-12-11 | Stop reason: HOSPADM

## 2018-12-11 RX ORDER — BACITRACIN 65 UNIT/MG
POWDER (GRAM) MISCELLANEOUS
Status: DISCONTINUED | OUTPATIENT
Start: 2018-12-11 | End: 2018-12-11 | Stop reason: HOSPADM

## 2018-12-11 RX ADMIN — GABAPENTIN 300 MG: 300 CAPSULE ORAL at 12:56

## 2018-12-11 RX ADMIN — DIPHENHYDRAMINE HYDROCHLORIDE 12.5 MG: 50 INJECTION, SOLUTION INTRAMUSCULAR; INTRAVENOUS at 17:45

## 2018-12-11 RX ADMIN — ONDANSETRON 4 MG: 2 INJECTION INTRAMUSCULAR; INTRAVENOUS at 16:37

## 2018-12-11 RX ADMIN — SODIUM CHLORIDE, SODIUM LACTATE, POTASSIUM CHLORIDE, CALCIUM CHLORIDE: 600; 310; 30; 20 INJECTION, SOLUTION INTRAVENOUS at 12:53

## 2018-12-11 RX ADMIN — ACETAMINOPHEN 1000 MG: 500 TABLET, COATED ORAL at 12:56

## 2018-12-11 RX ADMIN — CELECOXIB 200 MG: 200 CAPSULE ORAL at 12:56

## 2018-12-11 ASSESSMENT — PAIN SCALES - GENERAL
PAINLEVEL_OUTOF10: 0
PAINLEVEL_OUTOF10: 3
PAINLEVEL_OUTOF10: 0

## 2018-12-11 NOTE — OR NURSING
1220: Brought patient back to pre-op and assumed care.  1300: Patient allergies and NPO status verified, home medication reconciliation completed and belongings secured. Patient verbalizes understanding of pain scale, expected course of stay and plan of care. Surgical site verified with patient. IV access established. Sequentials placed on legs.

## 2018-12-12 NOTE — OR SURGEON
Immediate Post OP Note    PreOp Diagnosis: RIGHT shoulder impingement, large retracted RCT    PostOp Diagnosis: SAME with labral fraying    Procedure(s): RIGHT   SHOULDER DECOMPRESSION ARTHROSCOPIC- SUBACROMAL,  LABRAL DEBRIDEMENT - Wound Class: Clean  SHOULDER ARTHROSCOPY W/ ROTATOR CUFF REPAIR-  AND SUPERIOR CAPSULAR RECONSTUCTION, ECHEVARRIA - Wound Class: Clean    Surgeon(s):  Margaret Olson M.D.    Anesthesiologist/Type of Anesthesia:  Anesthesiologist: Mark Chagn M.D./General    Surgical Staff:  Circulator: Lilly Jacob R.N.  Scrub Person: Rufina Corbin  Private Scrub: ROMAN LozanoNEMILIA    Specimens removed if any:  * No specimens in log *    Estimated Blood Loss:  min    Findings: as above    Complications: none    Bel Arreola        12/11/2018 4:11 PM Margaret Olson M.D.

## 2018-12-12 NOTE — OP REPORT
DATE OF SERVICE:  12/11/2018    PREOPERATIVE DIAGNOSES:  Right shoulder impingement syndrome, large, retracted   rotator cuff tear.    POSTOPERATIVE DIAGNOSES:  Right shoulder impingement syndrome, large,   retracted rotator cuff tear, global labral fraying, mild glenohumeral joint   chondromalacia.    PROCEDURES PERFORMED:  Right shoulder arthroscopy, subacromial decompression,   labral debridement, chondroplasty, arthroscopic rotator cuff repair with   superior capsular reconstruction.    SURGEON:  Margaret Olson MD    ASSISTANT:  Parminder Morris CFA    ANESTHESIOLOGIST:  Mark Chang MD    TYPE OF ANESTHESIA:  General, with preoperative interscalene nerve block.    IV FLUID:  1 liter crystalloid.    ESTIMATED BLOOD LOSS:  Minimal.    DRAINS:  None.    SPECIMENS:  None.    COMPLICATIONS:  None.    IMPLANTS:  Elba Iconix anchors x2, ReelX anchors x3, Apollo anchors x3 (8   anchors total), [employer] AlloMend graft.    REASON FOR PROCEDURE:  The patient is a 63-year-old female with right shoulder   pain, and weakness, which she has been experiencing for years.  An MRI   demonstrated a large retracted rotator cuff tear.  We decided to proceed with   arthroscopy.    DESCRIPTION OF OPERATION:  The patient was given a right interscalene nerve   block by the anesthesiologist before surgery.  Once back in the operating   room, a breathing tube was placed.  She was then placed in the lateral   decubitus position.  She was given 2 g of IV Ancef.  The right upper extremity   was then prepped with ChloraPrep and draped in standard sterile fashion.  It   was then placed in the Arthrex traction device.  Bony landmarks were drawn and   a standard posterior portal was established.  The arthroscope was then   inserted into the glenohumeral joint.  An anterosuperior cannula was placed in   the rotator interval, just beneath the biceps tendon.  A probe was inserted.    There was a massive superior rotator cuff tear, exposing the  biceps tendon.    There was global labral fraying with calcification noted to the   posterosuperior glenoid labrum.  A smoothing chondroplasty as well as global   labral debridement was performed.  The subscapularis tendon was intact.  The   arthroscope was placed anteriorly to visualize the posterior and   posterosuperior labral tissue, with a calcified rim noted from the 12 o'clock   to 9 o'clock position in this right shoulder.  Next, the arthroscope was   placed into the subacromial space.  A lateral portal was established.  There   was a copious amount of thickened, inflamed bursal tissue, which was removed   with the 4-0 aggressive shaver.  The borders of the acromion were defined.    The integrity of the coracoacromial ligament was maintained.  A smoothing   subacromial decompression was performed.  Next, the posterosuperior labral   calcification was probed.  It was noted to be loose, and was therefore simply   skeletonized with the cautery and the periosteal elevator and removed.  This   left a nice clean superior glenoid rim for anchor placement.  The greater   tuberosity as well as the superior glenoid rim were both prepared roughening   both surfaces to create a healing response for the graft.  There was a large,   retracted rotator cuff tear that was not primarily repairable.  Thus, the   decision was made to perform with a superior capsular reconstruction.    Percutaneous portals were established both anteriorly and posteriorly through   which Iconix anchors were drilled and tapped into place.  These were 2.3   Iconix anchor triple loaded with high strength #2 suture.  Next, the   arthroscope was placed posteriorly.  Apollo anchors loaded with high strength   #2 tape were then placed just off the articular margin along the medial row.    At this point, using a suture pull through technique, the floor size of this   quadrilateral space among the 4 anchors was measured.  The measurements of   each of the  4 sites were written on a white board on the side of the room.    Next, low flow was kept in the shoulder and passport cannula was placed   laterally.  Attention was then turned to the back table and an [employer]   AlloMend graft had been opened.  It was measured appropriately and cut to   size.  The area where each of the 4 anchors was marked with a small hole   placed to be able to pull the sutures through this area.  Next, attention was   turned back to the shoulder.  Sutures were then grasped out of the lateral 8   mm cannula.  The sutures were then placed with a Tuohy needle and a wire   through the graft.  Using a double pulley technique, the medial aspect was   secured to the glenoid.  An excellent fit was noted.  A SpeedBridge repair was   then performed using a posterolateral ReelX anchor, then an anterolateral   ReelX anchor.  When putting in the anterolateral ReelX anchor, it was noted to   be an area of extremely soft bone and likely a cyst.  Thus, this anchor was   loose even though it had been expanded with 2 reels of tape.  Thus, a blocking   anchor was then taken.  This was a 6.5 mm peak Apollo rescue anchor.  It was   placed at a different angle, going from anterolateral to posteromedial.  This   was noted to have a good bite into bone and blocked the ReelX, and also   tightened and well tensioned to the tapes at the anterolateral corner of the   superior capsular reconstruction graft.  Next, high strength #2 suture was   then used to take 2 side-to-side passes through the anterior aspect of the   graft and the rotator interval tissue.  Similarly, the infraspinatus portion   of the rotator cuff was repaired to the posterior aspect of the graft.  Near   complete humeral head coverage was obtained, with excellent graft tension.    One liter of bacitracin saline fluid was flushed through the shoulder.    Notably, the biceps tendon was tenodesed prior to the superior capsular   reconstruction and  rotator cuff repair.  This was done to remove the biceps   tendon from the joint and tenodesed to maintain anatomical tension.  Prior to   placement of the rotator cuff and superior capsular reconstruction anchors, 2   cinch sutures were placed with 1.2 mm tape and these tails were then placed   into a ReelX anchor, which was placed through an 8 mm cannula out of the   anterosuperior portal and driven down tenodesing the biceps high in the   groove.  The 2 cm segment was then removed with superior labral tissue   debrided.  This was done prior to placing the rotator cuff anchors.  After 1   liter of bacitracin saline fluid was flushed through the subacromial space,   all instruments were removed.  Portals were closed with 3-0 nylon suture.  All   drapes were removed and the arm was carefully placed into a shoulder   abduction sling.  The patient was placed supine on a stretcher and taken to   recovery room, in stable condition.       ____________________________________     MD JYOTI FONSECA / ABILIO    DD:  12/11/2018 16:21:42  DT:  12/11/2018 16:54:41    D#:  [id number]  Job#:  [id number]

## 2018-12-12 NOTE — OR NURSING
1720-arrived pacu2. Denies pain or nausea at present.  Pt assisted to dress and into recliner tolerated well. Awaiting friend arrival.  1755-pt c/o nausea spitting up bubbly phlegm.  Medicated per MAR with some relief.  1810-friend to chairside. D/c instructions given with good understanding.  1830-d/c home to friend.

## 2018-12-12 NOTE — DISCHARGE INSTRUCTIONS
ACTIVITY: Rest and take it easy for the first 24 hours.  A responsible adult is recommended to remain with you during that time.  It is normal to feel sleepy.  We encourage you to not do anything that requires balance, judgment or coordination.    MILD FLU-LIKE SYMPTOMS ARE NORMAL. YOU MAY EXPERIENCE GENERALIZED MUSCLE ACHES, THROAT IRRITATION, HEADACHE AND/OR SOME NAUSEA.    FOR 24 HOURS DO NOT:  Drive, operate machinery or run household appliances.  Drink beer or alcoholic beverages.   Make important decisions or sign legal documents.    SPECIAL INSTRUCTIONS: *           Post-Operative Shoulder Instructions       Dressing and wound care: Keep your shoulder dressing clean and dry after surgery.  Be aware that some leaking of blood or fluid from your dressing can occur and is normal. You may remove your dressing 3 days after the operation.  Notice that you have a single incision and/or several small incisions that have been closed with stitches.  Cover each of these incisions with a light dressing or band-aids.  This keeps the surgical incisions clean, as well as preventing your clothes from spotting with blood or fluid.  Change band-aids or light dressing daily.     Shower / bathing: Keep the shoulder dry for 3 days after your surgery.  Then, you may shower. You may let soap and water run over skin incisions, but do not immerse your shoulder in water.  No swimming pools, hot tubs, or baths are recommended until at least 3 weeks after surgery.     ** If you have had a shoulder replacement, then please wait until your staples are removed at 7-14 days post-op before allowing your incision to get wet.       Ice: Apply an ice pack to your shoulder (15 minutes on the shoulder, 15 minutes off the shoulder), as you feel necessary to help with the pain and swelling.         Sling / Shoulder Immobilizer: The sling should be on at all times, except when bathing and doing your demonstrated exercises.     Activity: It is  important to move your shoulder, as well as your elbow, wrist, and hand several times daily, starting the day after surgery.  You may do pendulum exercises with your operative arm starting the day after surgery.  Pendulum (dangling [place]) exercises are encouraged 2-3 times daily.  The sling will need to be removed for pendulum exercises.     Pain medication: Take your pain medicine, as needed and prescribed.  Do not drive or operate machinery while taking narcotic pain medication.   You may start or resume anti-inflammatory medication (i.e. ibuprofen, naproxen) anytime after surgery, which should be taken with food to avoid stomach irritation.     Problems:     If you are having problems with your shoulder (unexpected pain, excessive bleeding or discharge from your incisions, fevers/chills) do not hesitate to call the office or visit the nearest emergency room for evaluation.     Follow-up:     Make sure that you have an appointment 7-14 days following surgery.  Your stitches will be removed, and your procedure/rehabilitation will be discussed at that time.   Physical therapy may be prescribed at that time.         Margaret Olson MD   [facility]   [phone]   Additional Information Start Time Order ID Status   Service: --    Verbal Mode: --    Ordering User: Margaret Olson M.D.    Process Instructions: --    Expected Discharge Date: 12/11/18    Expected Discharge Time: --     12/11/18 [id number] Sent      Question: Against Medical Advice Answer: No    1     **    DIET: To avoid nausea, slowly advance diet as tolerated, avoiding spicy or greasy foods for the first day.  Add more substantial food to your diet according to your physician's instructions.  Babies can be fed formula or breast milk as soon as they are hungry.  INCREASE FLUIDS AND FIBER TO AVOID CONSTIPATION.      FOLLOW-UP APPOINTMENT:  A follow-up appointment should be arranged with your doctor . *Call if not already  scheduled.  You should CALL YOUR PHYSICIAN if you develop:  Fever greater than 101 degrees F.  Pain not relieved by medication, or persistent nausea or vomiting.  Excessive bleeding (blood soaking through dressing) or unexpected drainage from the wound.  Extreme redness or swelling around the incision site, drainage of pus or foul smelling drainage.  Inability to urinate or empty your bladder within 8 hours.  Problems with breathing or chest pain.    You should call 911 if you develop problems with breathing or chest pain.  If you are unable to contact your doctor or surgical center, you should go to the nearest emergency room or urgent care center.  Physician's telephone #: **[phone]*    If any questions arise, call your doctor.  If your doctor is not available, please feel free to call the Surgical Center at [phone].  The Center is open Monday through Friday from 7AM to 7PM.  You can also call the [employer] HOTLINE open 24 hours/day, 7 days/week and speak to a nurse at [phone], or toll free at [phone].    A registered nurse may call you a few days after your surgery to see how you are doing after your procedure.    MEDICATIONS: Resume taking daily medication.  Take prescribed pain medication with food.  If no medication is prescribed, you may take non-aspirin pain medication if needed.  PAIN MEDICATION CAN BE VERY CONSTIPATING.  Take a stool softener or laxative such as senokot, pericolace, or milk of magnesia if needed.    Prescription given for *PATIENT ALREADY HAS.  Last pain medication given at *NONE in PACU*.    If your physician has prescribed pain medication that includes Acetaminophen (Tylenol), do not take additional Acetaminophen (Tylenol) while taking the prescribed medication.    Depression / Suicide Risk    As you are discharged from this Critical access hospital facility, it is important to learn how to keep safe from harming yourself.    Recognize the warning signs:  · Abrupt changes in  personality, positive or negative- including increase in energy   · Giving away possessions  · Change in eating patterns- significant weight changes-  positive or negative  · Change in sleeping patterns- unable to sleep or sleeping all the time   · Unwillingness or inability to communicate  · Depression  · Unusual sadness, discouragement and loneliness  · Talk of wanting to die  · Neglect of personal appearance   · Rebelliousness- reckless behavior  · Withdrawal from people/activities they love  · Confusion- inability to concentrate     If you or a loved one observes any of these behaviors or has concerns about self-harm, here's what you can do:  · Talk about it- your feelings and reasons for harming yourself  · Remove any means that you might use to hurt yourself (examples: pills, rope, extension cords, firearm)  · Get professional help from the community (Mental Health, Substance Abuse, psychological counseling)  · Do not be alone:Call your Safe Contact- someone whom you trust who will be there for you.  · Call your local CRISIS HOTLINE 935-5094 or 354-474-6816  · Call your local Children's Mobile Crisis Response Team [place] (596) 975-9771 or wwwDTVCast  · Call the toll free National Suicide Prevention Hotlines   · National Suicide Prevention Lifeline 279-915-BDKZ (5035)  National Hope Line Network 800-SUICIDE (607-2267)    Peripheral Nerve Block Discharge Instructions from Same Day Surgery and Inpatient :    What to Expect - Upper Extremity  · You may experience numbness and weakness in {ARM LOCATION PNB:119669}  on the same side as your surgery  · This is normal. For some people, this may be an unpleasant sensation. Be very careful with your numb limb  · Ask for help when you need it  Shoulder Surgery Side Effects  · In addition to numbness and weakness you may experience other symptoms  · Other nerves that are close to those nerves injected can also be affected by local anesthesia  · You may  "experience a hoarseness in your voice  · Your breathing may feel different  · You may also notice drooping of your eyelid, pupil constriction, and decreased sweating, on the side of your surgery  · All of these side effects are normal and will resolve when the local anesthetic wears off   Prevent Injury  · Protect the limb like a baby  · Beware of exposing your limb to extreme heat or cold or trauma  · The limb may be injured without you noticing because it is numb  · Keep the limb elevated whenever possible  · Do not sleep on the limb  · Change the position of the limb regularly  · Avoid putting pressure on your surgical limb  Pain Control  · The initial block on the day of surgery will make your extremity feel \"numb\"  · Any consecutive injection including prior to discharge from the hospital will make your extremity feel \"numb\"  · You may feel an aching or burning when the local anesthesia starts to wear off  · Take pain pills as prescribed by your surgeon  · Call your surgeon or anesthesiologist if you do not have adequate pain control  ·   "

## 2018-12-12 NOTE — OR NURSING
"Respirations easy.  Dressing clean and dry. Immobilizer in place.  Fingers warm and mobile with brisk cap refill, nailbeds pink.  Patient wore Thigh high TEDS and sequentials for about 45 minutes, then pulled them off and states \"I will not wear these\". Rationale for why they are used given to patient, she verbalizes understanding and states she will be walking at home.    "

## 2018-12-23 ENCOUNTER — APPOINTMENT (OUTPATIENT)
Dept: RADIOLOGY | Facility: MEDICAL CENTER | Age: 63
End: 2018-12-23
Attending: EMERGENCY MEDICINE
Payer: COMMERCIAL

## 2018-12-23 ENCOUNTER — HOSPITAL ENCOUNTER (EMERGENCY)
Facility: MEDICAL CENTER | Age: 63
End: 2018-12-23
Attending: EMERGENCY MEDICINE
Payer: COMMERCIAL

## 2018-12-23 VITALS
RESPIRATION RATE: 18 BRPM | SYSTOLIC BLOOD PRESSURE: 150 MMHG | OXYGEN SATURATION: 96 % | HEART RATE: 71 BPM | TEMPERATURE: 97.4 F | HEIGHT: 61 IN | DIASTOLIC BLOOD PRESSURE: 86 MMHG | WEIGHT: 172.18 LBS | BODY MASS INDEX: 32.51 KG/M2

## 2018-12-23 DIAGNOSIS — R42 LIGHTHEADEDNESS: ICD-10-CM

## 2018-12-23 LAB
ALBUMIN SERPL BCP-MCNC: 3.5 G/DL (ref 3.2–4.9)
ALBUMIN/GLOB SERPL: 1.3 G/DL
ALP SERPL-CCNC: 86 U/L (ref 30–99)
ALT SERPL-CCNC: 15 U/L (ref 2–50)
ANION GAP SERPL CALC-SCNC: 5 MMOL/L (ref 0–11.9)
AST SERPL-CCNC: 15 U/L (ref 12–45)
BASOPHILS # BLD AUTO: 0.6 % (ref 0–1.8)
BASOPHILS # BLD: 0.05 K/UL (ref 0–0.12)
BILIRUB SERPL-MCNC: 0.4 MG/DL (ref 0.1–1.5)
BUN SERPL-MCNC: 25 MG/DL (ref 8–22)
CALCIUM SERPL-MCNC: 8.8 MG/DL (ref 8.4–10.2)
CHLORIDE SERPL-SCNC: 107 MMOL/L (ref 96–112)
CO2 SERPL-SCNC: 24 MMOL/L (ref 20–33)
CREAT SERPL-MCNC: 0.82 MG/DL (ref 0.5–1.4)
EOSINOPHIL # BLD AUTO: 0.2 K/UL (ref 0–0.51)
EOSINOPHIL NFR BLD: 2.4 % (ref 0–6.9)
ERYTHROCYTE [DISTWIDTH] IN BLOOD BY AUTOMATED COUNT: 44.7 FL (ref 35.9–50)
GLOBULIN SER CALC-MCNC: 2.6 G/DL (ref 1.9–3.5)
GLUCOSE SERPL-MCNC: 101 MG/DL (ref 65–99)
HCT VFR BLD AUTO: 43.2 % (ref 37–47)
HGB BLD-MCNC: 14.4 G/DL (ref 12–16)
IMM GRANULOCYTES # BLD AUTO: 0.03 K/UL (ref 0–0.11)
IMM GRANULOCYTES NFR BLD AUTO: 0.4 % (ref 0–0.9)
LYMPHOCYTES # BLD AUTO: 1.91 K/UL (ref 1–4.8)
LYMPHOCYTES NFR BLD: 23.3 % (ref 22–41)
MCH RBC QN AUTO: 29.7 PG (ref 27–33)
MCHC RBC AUTO-ENTMCNC: 33.3 G/DL (ref 33.6–35)
MCV RBC AUTO: 89.1 FL (ref 81.4–97.8)
MONOCYTES # BLD AUTO: 0.56 K/UL (ref 0–0.85)
MONOCYTES NFR BLD AUTO: 6.8 % (ref 0–13.4)
NEUTROPHILS # BLD AUTO: 5.44 K/UL (ref 2–7.15)
NEUTROPHILS NFR BLD: 66.5 % (ref 44–72)
NRBC # BLD AUTO: 0 K/UL
NRBC BLD-RTO: 0 /100 WBC
PLATELET # BLD AUTO: 292 K/UL (ref 164–446)
PMV BLD AUTO: 10.2 FL (ref 9–12.9)
POTASSIUM SERPL-SCNC: 4.2 MMOL/L (ref 3.6–5.5)
PROT SERPL-MCNC: 6.1 G/DL (ref 6–8.2)
RBC # BLD AUTO: 4.85 M/UL (ref 4.2–5.4)
SODIUM SERPL-SCNC: 136 MMOL/L (ref 135–145)
TROPONIN I SERPL-MCNC: <0.02 NG/ML (ref 0–0.04)
WBC # BLD AUTO: 8.2 K/UL (ref 4.8–10.8)

## 2018-12-23 PROCEDURE — 80053 COMPREHEN METABOLIC PANEL: CPT

## 2018-12-23 PROCEDURE — 70450 CT HEAD/BRAIN W/O DYE: CPT

## 2018-12-23 PROCEDURE — 99285 EMERGENCY DEPT VISIT HI MDM: CPT

## 2018-12-23 PROCEDURE — 700105 HCHG RX REV CODE 258: Performed by: EMERGENCY MEDICINE

## 2018-12-23 PROCEDURE — 700102 HCHG RX REV CODE 250 W/ 637 OVERRIDE(OP): Performed by: EMERGENCY MEDICINE

## 2018-12-23 PROCEDURE — 93005 ELECTROCARDIOGRAM TRACING: CPT | Performed by: EMERGENCY MEDICINE

## 2018-12-23 PROCEDURE — A9270 NON-COVERED ITEM OR SERVICE: HCPCS | Performed by: EMERGENCY MEDICINE

## 2018-12-23 PROCEDURE — 84484 ASSAY OF TROPONIN QUANT: CPT

## 2018-12-23 PROCEDURE — 36415 COLL VENOUS BLD VENIPUNCTURE: CPT

## 2018-12-23 PROCEDURE — 85025 COMPLETE CBC W/AUTO DIFF WBC: CPT

## 2018-12-23 RX ORDER — MECLIZINE HYDROCHLORIDE 25 MG/1
25 TABLET ORAL ONCE
Status: COMPLETED | OUTPATIENT
Start: 2018-12-23 | End: 2018-12-23

## 2018-12-23 RX ORDER — SODIUM CHLORIDE 9 MG/ML
1000 INJECTION, SOLUTION INTRAVENOUS ONCE
Status: COMPLETED | OUTPATIENT
Start: 2018-12-23 | End: 2018-12-23

## 2018-12-23 RX ADMIN — MECLIZINE HYDROCHLORIDE 25 MG: 25 TABLET ORAL at 06:48

## 2018-12-23 RX ADMIN — SODIUM CHLORIDE 1000 ML: 9 INJECTION, SOLUTION INTRAVENOUS at 06:48

## 2018-12-23 NOTE — ED PROVIDER NOTES
ED Provider Note    CHIEF COMPLAINT  Chief Complaint   Patient presents with   • Dizziness       HPI  Savanna Mccarthy is a 63 y.o. female who presents for evaluation of 2-3 days of dizziness.  The patient distinctly recalls no trauma.  She denies any ringing in the ears nausea or vomiting.  She did have her right rotator cuff repaired by Dr. Olson approximately 11 days ago.  This was an uncomplicated same-day procedure.  She denies any new medications.  Specifically no cough chest pain numbness tingling weakness in the arms legs or face.  She reports feeling dizzy when she sits up.  No vertigo no sensation of moving.  She denies any headache no double vision blurry vision.  No new or over-the-counter medications    REVIEW OF SYSTEMS  See HPI for further details.  No numbness tingling weakness fevers chills all other systems are negative.     PAST MEDICAL HISTORY  Past Medical History:   Diagnosis Date   • Pneumonia    • Bronchitis    • Hepatitis B    • Anesthesia     PONV; combative when waking up, and verbally abusive; asthma attack in the  after surgery   • Arthritis     all over   • ASTHMA     episode of bronchial asthma after anesthesia, states almost    • Bowel habit changes     diarrhea from gluten intolerance   • Bruises easily    • Dental disorder     upper and partial lower, dental implants   • Heart burn    • High cholesterol    • Indigestion    • Pain     neck and back   • Shoulder pain, bilateral    • Unspecified urinary incontinence     better after surgery, but still has problems sometimes   • Urinary bladder disorder        FAMILY HISTORY  Noncontributory    SOCIAL HISTORY  Social History     Social History   • Marital status:      Spouse name: N/A   • Number of children: N/A   • Years of education: N/A     Social History Main Topics   • Smoking status: Current Every Day Smoker     Packs/day: 1.00     Years: 51.00     Types: Cigarettes     Last attempt to quit:  9/11/2012   • Smokeless tobacco: Never Used      Comment: 12/3/2018 about 1/2 ppd but trying to stop, goes some days without   • Alcohol use 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   • Drug use: No   • Sexual activity: Not on file     Other Topics Concern   • Not on file     Social History Narrative   • No narrative on file     Smoke cigarettes  SURGICAL HISTORY  Past Surgical History:   Procedure Laterality Date   • SHOULDER DECOMPRESSION ARTHROSCOPIC Right 12/11/2018    Procedure: SHOULDER DECOMPRESSION ARTHROSCOPIC- SUBACROMAL,  LABRAL DEBRIDEMENT;  Surgeon: Margaret Olson M.D.;  Location: [facility];  Service: Orthopedics   • SHOULDER ARTHROSCOPY W/ ROTATOR CUFF REPAIR Right 12/11/2018    Procedure: SHOULDER ARTHROSCOPY W/ ROTATOR CUFF REPAIR-  AND SUPERIOR CAPSULAR RECONSTUCTION, ECHEVARRIA;  Surgeon: Margaret Olson M.D.;  Location: [facility];  Service: Orthopedics   • CARPAL TUNNEL RELEASE Left 9/11/2018    Procedure: CARPAL TUNNEL RELEASE;  Surgeon: Margaret Olson M.D.;  Location: [facility];  Service: Orthopedics   • SHOULDER DECOMPRESSION ARTHROSCOPIC Left 9/11/2018    Procedure: SHOULDER DECOMPRESSION ARTHROSCOPIC- SUBACROMIAL, LABRAL DEBRIDEMENT;  Surgeon: Margaret Olson M.D.;  Location: [facility];  Service: Orthopedics   • SHOULDER ARTHROSCOPY W/ ROTATOR CUFF REPAIR Left 9/11/2018    Procedure: SHOULDER ARTHROSCOPY W/ ROTATOR CUFF REPAIR-SUPERIOR CAPSULAR RECONSTRUCTION, ECHEVARRIA;  Surgeon: Margaret Olson M.D.;  Location: [facility];  Service: Orthopedics   • CERVICAL DISK AND FUSION ANTERIOR  10/1/2012    Performed by Raul López M.D. at SURGERY [facility]   • HYSTERECTOMY, VAGINAL  2007    hysterectomy, cystocele, rectocele   • BREAST BIOPSY Left 1992    breast biopsy    • OTHER  1992, 1997    CT liver, ablated cysts       CURRENT MEDICATIONS  Home Medications     Reviewed by Emely Cheng (Pharmacy Tech) on  "12/23/18 at 0642  Med List Status: Complete   Medication Last Dose Status   aspirin (ASA) 325 MG Tab 12/22/2018 Active   calcium carbonate (TUMS) 500 MG CHEW 12/22/2018 Active   nicotine polacrilex (NICORETTE) 4 MG gum 12/22/2018 Active   vitamin D (CHOLECALCIFEROL) 1000 UNIT Tab 12/22/2018 Active                ALLERGIES  Allergies   Allergen Reactions   • Ciprofloxacin Vomiting   • Morphine      \"makes her crazy\"         PHYSICAL EXAM  VITAL SIGNS: /85   Pulse 74   Temp 36.6 °C (97.9 °F) (Temporal)   Resp 20   Ht 1.549 m (5' 1\")   Wt 78.1 kg (172 lb 2.9 oz)   SpO2 91%   BMI 32.53 kg/m²       Constitutional: Well developed, Well nourished, No acute distress, Non-toxic appearance.   HENT: Normocephalic, Atraumatic, Bilateral external ears normal, dry mucous membranes no oral exudates, Nose normal.   Eyes: PERRLA, EOMI, Conjunctiva normal, No discharge.   Neck: Normal range of motion, No tenderness, Supple, No stridor.   Cardiovascular: Normal heart rate, Normal rhythm, No murmurs, No rubs, No gallops.   Thorax & Lungs: Normal breath sounds, No respiratory distress, No wheezing, No chest tenderness.   Abdomen: Bowel sounds normal, Soft, No tenderness, No masses, No pulsatile masses.   Skin: Warm, Dry, No erythema, No rash.   Back: No tenderness, No CVA tenderness.   Extremities: Intact distal pulses, No edema, No tenderness, No cyanosis, No clubbing.  Right shoulder is in a postoperative shoulder sling  Neurologic: Alert & oriented x 3, Normal motor function, Normal sensory function, No focal deficits noted.  Cranial nerves II through XII are grossly intact no pronator drift no speech abnormalities  Psychiatric: Affect normal, Judgment normal, Mood normal.     EKG  Interpretation by me.  Sinus rhythm rate 78 no acute ST segment elevation or depression no pathological T wave inversions.  Intervals and axis are normal no suggestion of ischemia or arrhythmia    RADIOLOGY/PROCEDURES  CT-HEAD W/O   Final " Result         1.  No acute intracranial abnormality.   2.  Atherosclerosis.        Results for orders placed or performed during the hospital encounter of 12/23/18   CBC WITH DIFFERENTIAL   Result Value Ref Range    WBC 8.2 4.8 - 10.8 K/uL    RBC 4.85 4.20 - 5.40 M/uL    Hemoglobin 14.4 12.0 - 16.0 g/dL    Hematocrit 43.2 37.0 - 47.0 %    MCV 89.1 81.4 - 97.8 fL    MCH 29.7 27.0 - 33.0 pg    MCHC 33.3 (L) 33.6 - 35.0 g/dL    RDW 44.7 35.9 - 50.0 fL    Platelet Count 292 164 - 446 K/uL    MPV 10.2 9.0 - 12.9 fL    Neutrophils-Polys 66.50 44.00 - 72.00 %    Lymphocytes 23.30 22.00 - 41.00 %    Monocytes 6.80 0.00 - 13.40 %    Eosinophils 2.40 0.00 - 6.90 %    Basophils 0.60 0.00 - 1.80 %    Immature Granulocytes 0.40 0.00 - 0.90 %    Nucleated RBC 0.00 /100 WBC    Neutrophils (Absolute) 5.44 2.00 - 7.15 K/uL    Lymphs (Absolute) 1.91 1.00 - 4.80 K/uL    Monos (Absolute) 0.56 0.00 - 0.85 K/uL    Eos (Absolute) 0.20 0.00 - 0.51 K/uL    Baso (Absolute) 0.05 0.00 - 0.12 K/uL    Immature Granulocytes (abs) 0.03 0.00 - 0.11 K/uL    NRBC (Absolute) 0.00 K/uL   COMP METABOLIC PANEL   Result Value Ref Range    Sodium 136 135 - 145 mmol/L    Potassium 4.2 3.6 - 5.5 mmol/L    Chloride 107 96 - 112 mmol/L    Co2 24 20 - 33 mmol/L    Anion Gap 5.0 0.0 - 11.9    Glucose 101 (H) 65 - 99 mg/dL    Bun 25 (H) 8 - 22 mg/dL    Creatinine 0.82 0.50 - 1.40 mg/dL    Calcium 8.8 8.4 - 10.2 mg/dL    AST(SGOT) 15 12 - 45 U/L    ALT(SGPT) 15 2 - 50 U/L    Alkaline Phosphatase 86 30 - 99 U/L    Total Bilirubin 0.4 0.1 - 1.5 mg/dL    Albumin 3.5 3.2 - 4.9 g/dL    Total Protein 6.1 6.0 - 8.2 g/dL    Globulin 2.6 1.9 - 3.5 g/dL    A-G Ratio 1.3 g/dL   TROPONIN   Result Value Ref Range    Troponin I <0.02 0.00 - 0.04 ng/mL   ESTIMATED GFR   Result Value Ref Range    GFR If African American >60 >60 mL/min/1.73 m 2    GFR If Non African American >60 >60 mL/min/1.73 m 2        COURSE & MEDICAL DECISION MAKING  Pertinent Labs & Imaging studies  reviewed. (See chart for details)  Patient presents here with symptoms of dizziness particularly when going from seated to standing or laying down to upright position.  Clinically she did appear slightly dehydrated and I have a suspicion for orthostatic hypotension.  She did not feel thirsty and was nauseous and could not take clear liquids therefore she was given IV fluids and meclizine.  After treatment she feels moderately better.  She has no strokelike symptoms such as focal numbness weakness tingling to the arms legs or face she has no ataxia to suggest posterior circulation stroke.  Her symptoms are consistent with orthostasis.  I counseled her to be careful at home and to make sure she drinks plenty of fluids.  I counseled her to follow-up with her PCP symptoms persist to see if she needs a tilt table test and/or cardiology consultation    FINAL IMPRESSION  1.  Dizziness  2.  Orthostasis    Electronically signed by: Reid Plascencia, 12/23/2018 6:53 AM

## 2018-12-23 NOTE — ED NOTES
"Chief Complaint   Patient presents with   • Dizziness     Pt reports dizziness upon standing for the last five days.    /85   Pulse 81   Temp 36.6 °C (97.9 °F) (Temporal)   Resp 20   Ht 1.549 m (5' 1\")   Wt 78.1 kg (172 lb 2.9 oz)   SpO2 97%   BMI 32.53 kg/m²       "

## 2018-12-23 NOTE — ED NOTES
D/c pt home,no  rx given . Pt aware of f/u instructions pmd   , aware to return for any changes or concerns. No further questions upon d/c home from ed

## 2019-01-31 LAB — EKG IMPRESSION: NORMAL

## 2019-05-15 ENCOUNTER — PATIENT OUTREACH (OUTPATIENT)
Dept: OTHER | Facility: MEDICAL CENTER | Age: 64
End: 2019-05-15

## 2019-08-20 ENCOUNTER — PATIENT OUTREACH (OUTPATIENT)
Dept: HEALTH INFORMATION MANAGEMENT | Facility: OTHER | Age: 64
End: 2019-08-20

## 2019-10-01 ENCOUNTER — HOSPITAL ENCOUNTER (OUTPATIENT)
Dept: RADIOLOGY | Facility: MEDICAL CENTER | Age: 64
End: 2019-10-01
Attending: FAMILY MEDICINE
Payer: COMMERCIAL

## 2019-10-01 DIAGNOSIS — F17.200 TOBACCO USE DISORDER: ICD-10-CM

## 2019-10-01 PROCEDURE — 71250 CT THORAX DX C-: CPT

## 2020-10-15 ENCOUNTER — PATIENT OUTREACH (OUTPATIENT)
Dept: HEALTH INFORMATION MANAGEMENT | Facility: OTHER | Age: 65
End: 2020-10-15

## 2023-10-26 ENCOUNTER — TELEPHONE (OUTPATIENT)
Dept: HEALTH INFORMATION MANAGEMENT | Facility: OTHER | Age: 68
End: 2023-10-26

## (undated) DEVICE — Device

## (undated) DEVICE — GLOVE BIOGEL SZ 7 SURGICAL PF LTX - (50PR/BX 4BX/CA)

## (undated) DEVICE — NEEDLE W/FACET TIP DULL VERSION W/STIMULATION CABLE SONOPLEX 21G X 4 (10/EA)"

## (undated) DEVICE — ELECTRODE 850 FOAM ADHESIVE - HYDROGEL RADIOTRNSPRNT (50/PK)

## (undated) DEVICE — HUMID-VENT HEAT AND MOISTURE EXCHANGE- (50/BX)

## (undated) DEVICE — BANDAGE ELASTIC 3 X 5 YDS - STERILE VELCRO (25/CA)LATEX

## (undated) DEVICE — TUBING PATIENT W/CONNECTOR REDEUCE (1EA)

## (undated) DEVICE — GLOVE BIOGEL INDICATOR SZ 7.5 SURGICAL PF LTX - (50PR/BX 4BX/CA)

## (undated) DEVICE — TAPE CLOTH MEDIPORE 6 INCH - (12RL/CA)

## (undated) DEVICE — CHLORAPREP 26 ML APPLICATOR - ORANGE TINT(25/CA)

## (undated) DEVICE — SODIUM CHL. IRRIGATION 0.9% 3000ML (4EA/CA 65CA/PF)

## (undated) DEVICE — SPONGE GAUZESTER 4 X 4 4PLY - (128PK/CA)

## (undated) DEVICE — SHAVER4.0 AGGRESSIVE + FORMLA (5EA/BX)

## (undated) DEVICE — NEPTUNE 4 PORT MANIFOLD - (20/PK)

## (undated) DEVICE — GLOVE 7.0 LF PF PROTEXIS (50PR/BX)

## (undated) DEVICE — GOWN SURGICAL X-LARGE ULTRA - FILM-REINFORCED (20/CA)

## (undated) DEVICE — BLOCK

## (undated) DEVICE — SUTURE 1 PDS-2 PLUS CTX - (24/BX)

## (undated) DEVICE — PADDING CAST 3 IN STERILE - 3 X 4 YDS (24EA/CA)

## (undated) DEVICE — SUCTION INSTRUMENT YANKAUER BULBOUS TIP W/O VENT (50EA/CA)

## (undated) DEVICE — SLEEVE SHOULDER DISP(ARTHREX) - (6/BX)

## (undated) DEVICE — SYRINGE ASEPTO - (50EA/CA

## (undated) DEVICE — DRESSING ABDOMINAL PAD STERILE 8 X 10" (360EA/CA)"

## (undated) DEVICE — SHAVER, 5.5 RESECTOR

## (undated) DEVICE — SODIUM CHL IRRIGATION 0.9% 1000ML (12EA/CA)

## (undated) DEVICE — BAG, SPONGE COUNT 50600

## (undated) DEVICE — PACK UPPER EXTREMITY SM OR - (3/CA)

## (undated) DEVICE — TAPE XBRAID TT 1.2MM (12EA/BX)

## (undated) DEVICE — GLOVE BIOGEL INDICATOR SZ 8 SURGICAL PF LTX - (50/BX 4BX/CA)

## (undated) DEVICE — TUBE CONNECTING SUCTION - CLEAR PLASTIC STERILE 72 IN (50EA/CA)

## (undated) DEVICE — DRAPE STRLE REG TOWEL 18X24 - (10/BX 4BX/CA)"

## (undated) DEVICE — GLOVE BIOGEL SZ 7.5 SURGICAL PF LTX - (50PR/BX 4BX/CA)

## (undated) DEVICE — GLOVE, LITE (PAIR)

## (undated) DEVICE — KIT ANESTHESIA W/CIRCUIT & 3/LT BAG W/FILTER (20EA/CA)

## (undated) DEVICE — DRESSING XEROFORM 1X8 - (50/BX 4BX/CA)

## (undated) DEVICE — TUBE E-T HI-LO CUFF 7.0MM (10EA/PK)

## (undated) DEVICE — ELECTRODE DUAL RETURN W/ CORD - (50/PK)

## (undated) DEVICE — CANISTER SUCTION RIGID RED 1500CC (40EA/CA)

## (undated) DEVICE — TUBING PUMP WITH CONNECTOR REDEUCE (1EA)

## (undated) DEVICE — KIT ROOM DECONTAMINATION

## (undated) DEVICE — LACTATED RINGERS INJ 1000 ML - (14EA/CA 60CA/PF)

## (undated) DEVICE — ABLATOR WAND SERFAS 90-S CRUISE

## (undated) DEVICE — WATER IRRIGATION STERILE 1000ML (12EA/CA)

## (undated) DEVICE — SUTURE 3-0 ETHILON FS-1 - (36/BX) 30 INCH

## (undated) DEVICE — DRAPE SHOULDER FLUID CONTROL - 77 X 85 (10/CA)

## (undated) DEVICE — PACK SHOULDER ARTHROSCOPY SM - (2EA/CA)

## (undated) DEVICE — CANNULA THREADED 5X75 (5EA/BX)

## (undated) DEVICE — TAPE XBRAID TT 2.0MM (12EA/BX)

## (undated) DEVICE — SUTURE GENERAL

## (undated) DEVICE — HEAD HOLDER JUNIOR/ADULT

## (undated) DEVICE — DRAPE U SPLIT IMP 54 X 76 - (24/CA)

## (undated) DEVICE — SENSOR SPO2 NEO LNCS ADHESIVE (20/BX) SEE USER NOTES

## (undated) DEVICE — MASK ANESTHESIA ADULT  - (100/CA)

## (undated) DEVICE — DRAPE IOBAN II INCISE 23X17 - (10EA/BX 4BX/CA)

## (undated) DEVICE — FIBERWIRE 2.0 (12EA/BX)

## (undated) DEVICE — GOWN WARMING STANDARD FLEX - (30/CA)

## (undated) DEVICE — GLOVE BIOGEL SZ 6.5 SURGICAL PF LTX (50PR/BX 4BX/CA)

## (undated) DEVICE — GLOVE BIOGEL INDICATOR SZ 7SURGICAL PF LTX - (50/BX 4BX/CA)

## (undated) DEVICE — SUTURE 4-0 ETHILON FS-2 18 (36PK/BX)"

## (undated) DEVICE — GLOVE BIOGEL SZ 8 SURGICAL PF LTX - (50PR/BX 4BX/CA)

## (undated) DEVICE — PROTECTOR ULNA NERVE - (36PR/CA)